# Patient Record
Sex: FEMALE | URBAN - METROPOLITAN AREA
[De-identification: names, ages, dates, MRNs, and addresses within clinical notes are randomized per-mention and may not be internally consistent; named-entity substitution may affect disease eponyms.]

---

## 2023-10-03 ENCOUNTER — APPOINTMENT (OUTPATIENT)
Dept: URBAN - METROPOLITAN AREA CLINIC 245 | Age: 58
Setting detail: DERMATOLOGY
End: 2023-10-04

## 2023-10-03 DIAGNOSIS — S0032XA BLISTER OF FACE, NECK, AND SCALP EXCEPT EYE, WITHOUT MENTION OF INFECTION: ICD-10-CM

## 2023-10-03 DIAGNOSIS — L01.01 NON-BULLOUS IMPETIGO: ICD-10-CM

## 2023-10-03 DIAGNOSIS — S0092XA BLISTER OF FACE, NECK, AND SCALP EXCEPT EYE, WITHOUT MENTION OF INFECTION: ICD-10-CM

## 2023-10-03 DIAGNOSIS — S00521A BLISTER OF FACE, NECK, AND SCALP EXCEPT EYE, WITHOUT MENTION OF INFECTION: ICD-10-CM

## 2023-10-03 DIAGNOSIS — S1012XA BLISTER OF FACE, NECK, AND SCALP EXCEPT EYE, WITHOUT MENTION OF INFECTION: ICD-10-CM

## 2023-10-03 DIAGNOSIS — S1092XA BLISTER OF FACE, NECK, AND SCALP EXCEPT EYE, WITHOUT MENTION OF INFECTION: ICD-10-CM

## 2023-10-03 DIAGNOSIS — L58.0 ACUTE RADIODERMATITIS: ICD-10-CM

## 2023-10-03 DIAGNOSIS — S00522A BLISTER OF FACE, NECK, AND SCALP EXCEPT EYE, WITHOUT MENTION OF INFECTION: ICD-10-CM

## 2023-10-03 DIAGNOSIS — I89.0 LYMPHEDEMA, NOT ELSEWHERE CLASSIFIED: ICD-10-CM

## 2023-10-03 DIAGNOSIS — S00429A BLISTER OF FACE, NECK, AND SCALP EXCEPT EYE, WITHOUT MENTION OF INFECTION: ICD-10-CM

## 2023-10-03 DIAGNOSIS — S0002XA BLISTER OF FACE, NECK, AND SCALP EXCEPT EYE, WITHOUT MENTION OF INFECTION: ICD-10-CM

## 2023-10-03 PROBLEM — S90.822A BLISTER (NONTHERMAL), LEFT FOOT, INITIAL ENCOUNTER: Status: ACTIVE | Noted: 2023-10-03

## 2023-10-03 PROCEDURE — OTHER COUNSELING: OTHER

## 2023-10-03 PROCEDURE — 99203 OFFICE O/P NEW LOW 30 MIN: CPT

## 2023-10-03 PROCEDURE — OTHER DIAGNOSIS COMMENT: OTHER

## 2023-10-03 PROCEDURE — OTHER PRESCRIPTION: OTHER

## 2023-10-03 PROCEDURE — OTHER PRESCRIPTION MEDICATION MANAGEMENT: OTHER

## 2023-10-03 PROCEDURE — OTHER ORDER TESTS: OTHER

## 2023-10-03 PROCEDURE — OTHER MEDICATION COUNSELING: OTHER

## 2023-10-03 PROCEDURE — OTHER MIPS QUALITY: OTHER

## 2023-10-03 RX ORDER — MUPIROCIN 20 MG/G
OINTMENT TOPICAL
Qty: 22 | Refills: 5 | Status: ERX | COMMUNITY
Start: 2023-10-03

## 2023-10-03 RX ORDER — MINOCYCLINE HYDROCHLORIDE 100 MG/1
CAPSULE ORAL
Qty: 28 | Refills: 0 | Status: ERX | COMMUNITY
Start: 2023-10-03

## 2023-10-03 ASSESSMENT — LOCATION DETAILED DESCRIPTION DERM
LOCATION DETAILED: LEFT DISTAL PRETIBIAL REGION
LOCATION DETAILED: RIGHT DISTAL PRETIBIAL REGION
LOCATION DETAILED: RIGHT CENTRAL MALAR CHEEK
LOCATION DETAILED: RIGHT LATERAL ABDOMEN
LOCATION DETAILED: LEFT DORSAL FOOT

## 2023-10-03 ASSESSMENT — LOCATION SIMPLE DESCRIPTION DERM
LOCATION SIMPLE: RIGHT CHEEK
LOCATION SIMPLE: RIGHT PRETIBIAL REGION
LOCATION SIMPLE: LEFT PRETIBIAL REGION
LOCATION SIMPLE: ABDOMEN
LOCATION SIMPLE: LEFT FOOT

## 2023-10-03 ASSESSMENT — LOCATION ZONE DERM
LOCATION ZONE: LEG
LOCATION ZONE: TRUNK
LOCATION ZONE: FACE
LOCATION ZONE: FEET

## 2023-10-03 NOTE — PROCEDURE: MEDICATION COUNSELING
Continue Regimen: BETAMETHASONE 0.05% twice daily only to areas that are thick and scaly and once daily to less thick and scaly. \\nWe will refill medication today. Detail Level: Zone Cantharidin Counseling:  I discussed with the patient the risks of Cantharidin including but not limited to pain, redness, burning, itching, and blistering.

## 2023-10-03 NOTE — PROCEDURE: MEDICATION COUNSELING
Thank you for choosing [facility] Podiatry / Foot & Ankle Surgery!    DR. ARANA'S CLINIC LOCATIONS     [facility] SCHEDULE SURGERY: [phone]   [address] [place] [address] APPOINTMENTS: [phone]   [place] [address] BILLING QUESTIONS: [phone]  [phone] RADIOLOGY: [phone]       [phone]    [place] [address]    [phone]  [phone]      Follow up: 2 weeks for 30 minutes for biopsy    Next steps:  antibiotic at your pharmacy      SIGNS OF INFECTION    expanding redness around the wound     yellow or greenish-colored pus or cloudy wound drainage     red streaking spreading from the wound     increased swelling, tenderness, or pain around the wound     fever  *If you notice any of these signs of infection, call us right away!       Protopic Counseling: Patient may experience a mild burning sensation during topical application. Protopic is not approved in children less than 2 years of age. There have been case reports of hematologic and skin malignancies in patients using topical calcineurin inhibitors although causality is questionable.

## 2023-10-03 NOTE — PROCEDURE: DIAGNOSIS COMMENT
Comment: Edema blister, with underlying ecchymosis from recent trauma
Render Risk Assessment In Note?: no
Detail Level: Simple

## 2023-10-03 NOTE — PROCEDURE: MEDICATION COUNSELING
Seen for AMS/s/op cardiac arrest.  Extubated now.  No focal neuro deficit.  No pupillary asymmetry is seen.  Likely underlying Poor cognition   Unlikely that pt had any hypoxic/anoxic brain injury.  CT Head - No acute pathology.  Supportive care.  Further w/up as per hospital course. Hydroxyzine Pregnancy And Lactation Text: This medication is not safe during pregnancy and should not be taken. It is also excreted in breast milk and breast feeding isn't recommended.

## 2023-10-03 NOTE — PROCEDURE: PRESCRIPTION MEDICATION MANAGEMENT
Render In Strict Bullet Format?: No
Detail Level: Zone
Initiate Treatment: Minocycline 100mg take 1PO BID for 2 weeks\\nMupirocin 2% ointment applied to the AA BID and in the nostrils twice a week

## 2023-10-03 NOTE — PROCEDURE: MEDICATION COUNSELING
Lumbar Epidural Injection: Recovery at Home    After a lumbar epidural injection, you don’t need to stay in bed when you get home. In fact, it’s best to walk around if you feel up to it. Just be careful about being too active. Even if you feel better right away, avoid activities that may strain your back. And follow up on all treatment with your doctor.  What to know about pain relief  Keep in mind that some people feel more pain at first. It usually goes away within a few days. You may also have headaches or trouble sleeping, but if these symptoms are severe, you should call your doctor right away. These should also go away within a few days. In general:  · An injection to reduce inflammation takes a few days to work, sometimes even up to a week. There may even be more pain at first.  · An injection to help locate the source of pain may give only brief pain relief. Later, you’ll feel the same as you did before the injection.  Tips for recovery  Whether you were injected for pain relief or diagnosis, these tips will help you recover:  · Take walks when you feel up to it.  · Rest if needed, but get up and move around after sitting for half an hour.  · Don’t exercise vigorously.  · Don’t drive the day of the procedure or until your doctor says it’s OK.  · Return to work or other activities when your doctor says you’re ready.  When to call your doctor  Call right away if you notice any of the following symptoms:  · Severe pain or headache  · Loss of bladder or bowel control  · Fever or chills  · Redness or swelling around the injection site       Date Last Reviewed: 11/1/2015 © 2000-2018 [employer]. [address] [place] [address], [place] [address]. All rights reserved. This information is not intended as a substitute for professional medical care. Always follow your healthcare professional's instructions.         Nsaids Counseling: NSAID Counseling: I discussed with the patient that NSAIDs should be taken with food. Prolonged use of NSAIDs can result in the development of stomach ulcers.  Patient advised to stop taking NSAIDs if abdominal pain occurs.  The patient verbalized understanding of the proper use and possible adverse effects of NSAIDs.  All of the patient's questions and concerns were addressed.

## 2023-10-03 NOTE — PROCEDURE: MEDICATION COUNSELING
----- Message from Rajiv Torres sent at 4/19/2017  3:59 PM CDT -----  Please call pt regarding her rx [phone]   Minocycline Counseling: Patient advised regarding possible photosensitivity and discoloration of the teeth, skin, lips, tongue and gums.  Patient instructed to avoid sunlight, if possible.  When exposed to sunlight, patients should wear protective clothing, sunglasses, and sunscreen.  The patient was instructed to call the office immediately if the following severe adverse effects occur:  hearing changes, easy bruising/bleeding, severe headache, or vision changes.  The patient verbalized understanding of the proper use and possible adverse effects of minocycline.  All of the patient's questions and concerns were addressed.